# Patient Record
Sex: FEMALE
[De-identification: names, ages, dates, MRNs, and addresses within clinical notes are randomized per-mention and may not be internally consistent; named-entity substitution may affect disease eponyms.]

---

## 2020-03-25 ENCOUNTER — APPOINTMENT (OUTPATIENT)
Dept: GYNECOLOGIC ONCOLOGY | Facility: CLINIC | Age: 35
End: 2020-03-25

## 2020-04-10 ENCOUNTER — OUTPATIENT (OUTPATIENT)
Dept: OUTPATIENT SERVICES | Facility: HOSPITAL | Age: 35
LOS: 1 days | End: 2020-04-10
Payer: COMMERCIAL

## 2020-04-10 ENCOUNTER — APPOINTMENT (OUTPATIENT)
Dept: GYNECOLOGIC ONCOLOGY | Facility: CLINIC | Age: 35
End: 2020-04-10

## 2020-04-10 DIAGNOSIS — R87.612 LOW GRADE SQUAMOUS INTRAEPITHELIAL LESION ON CYTOLOGIC SMEAR OF CERVIX (LGSIL): ICD-10-CM

## 2020-04-10 LAB — SURGICAL PATHOLOGY STUDY: SIGNIFICANT CHANGE UP

## 2020-04-10 PROCEDURE — 88321 CONSLTJ&REPRT SLD PREP ELSWR: CPT

## 2020-05-14 ENCOUNTER — APPOINTMENT (OUTPATIENT)
Dept: GYNECOLOGIC ONCOLOGY | Facility: CLINIC | Age: 35
End: 2020-05-14

## 2020-06-04 ENCOUNTER — APPOINTMENT (OUTPATIENT)
Dept: GYNECOLOGIC ONCOLOGY | Facility: CLINIC | Age: 35
End: 2020-06-04

## 2020-06-10 ENCOUNTER — TRANSCRIPTION ENCOUNTER (OUTPATIENT)
Age: 35
End: 2020-06-10

## 2020-06-10 ENCOUNTER — APPOINTMENT (OUTPATIENT)
Dept: GYNECOLOGIC ONCOLOGY | Facility: CLINIC | Age: 35
End: 2020-06-10
Payer: COMMERCIAL

## 2020-06-10 VITALS
WEIGHT: 147 LBS | DIASTOLIC BLOOD PRESSURE: 80 MMHG | HEIGHT: 64 IN | BODY MASS INDEX: 25.1 KG/M2 | SYSTOLIC BLOOD PRESSURE: 122 MMHG

## 2020-06-10 DIAGNOSIS — Z86.11 PERSONAL HISTORY OF TUBERCULOSIS: ICD-10-CM

## 2020-06-10 DIAGNOSIS — Z87.09 PERSONAL HISTORY OF OTHER DISEASES OF THE RESPIRATORY SYSTEM: ICD-10-CM

## 2020-06-10 PROCEDURE — 99203 OFFICE O/P NEW LOW 30 MIN: CPT | Mod: 25

## 2020-06-10 PROCEDURE — 57454 BX/CURETT OF CERVIX W/SCOPE: CPT

## 2020-06-10 RX ORDER — FLUTICASONE PROPION/SALMETEROL 100-50 MCG
100-50 BLISTER, WITH INHALATION DEVICE INHALATION
Refills: 0 | Status: ACTIVE | COMMUNITY

## 2020-06-10 RX ORDER — MONTELUKAST SODIUM 10 MG/1
TABLET, FILM COATED ORAL
Refills: 0 | Status: ACTIVE | COMMUNITY

## 2020-06-10 NOTE — PROCEDURE
[Colposcopy] : colposcopy [Cervical Dysplasia] : cervical dysplasia [Patient] : the patient [LGSIL] : low grade squamous intraepithelial lesion [Verbal Consent] : verbal consent was obtained prior to the procedure and is detailed in the patient's record [Biopsies Taken: # ___] : [unfilled] biopsies taken of the cervix [Acetowhite ___ o'clock] : ascetowhite changes at the [unfilled] ~Uo'clock position [Biopsy Locations ___ o'clock] : the biopsies were taken at the [unfilled] o'clock position [ECC Done] : an Endocervical curettage was performed.

## 2020-06-10 NOTE — PHYSICAL EXAM
[Normal] : Anus and perineum: Normal sphincter tone, no masses, no prolapse. [Fully active, able to carry on all pre-disease performance without restriction] : Status 0 - Fully active, able to carry on all pre-disease performance without restriction [Abnormal] : Examination of breasts: Abnormal [de-identified] : 1cm smooth, mobile cyst noted near nipple of L breast

## 2020-06-10 NOTE — HISTORY OF PRESENT ILLNESS
[FreeTextEntry1] : Problem\par 1) Abnormal Pap\par 2) Atypia on ECC\par \par Previous Therapy\par 1) Pap 9/2018 NILM, HPV neg\par 2) Pap 12/2019 LSIL HPV+ non 16/18\par 3) Colposcopy Cervical bx 7 wnl\par     a) ECC HGCIN\par    b) * slide review @ [facility] - Detached unoriented strips of squamous epithelium with atypia, suggestive of high grade dysplasia (but [facility] unalbe to do Ki67 and p16)\par \par 34 yo

## 2020-06-10 NOTE — PAST MEDICAL HISTORY
[Definite ___ (Date)] : the last menstrual period was [unfilled] [Menstruating] : The patient is menstruating [Total Preg ___] : G[unfilled] [Live Births ___] : P[unfilled]  [FreeTextEntry5] : D&C x 1 VTOP [Abortions ___] : Abortions:[unfilled]

## 2020-06-10 NOTE — ASSESSMENT
[FreeTextEntry1] : With the aid of diagrams we reviewed the findings in detail.  We reviewed HPV its pathogenesis and the implications of an abnormal cervical cytology and the pathogenesis of dysplasia in detail.ASCUS with high-risk HPV is associated with 4% of Pap smears.\par \par It has been reported that 40 to 58 percent of NICOLASA 2 lesions will regress if left untreated, while 22 percent progress to NICOLASA 3, and 5 percent progress to invasive cancer. ASCCP guidelines were reviewed with the patient. Excision procedure is recommended for both NICOLASA 2 and CIN3. \par \par The risks and benefits of LEEP vs. CKC were discussed which include, but are not limited to: bleeding, infection, cervical stenosis, cervical insufficiency. Possibility of needing a repeat procedure or hysterectomy was also discussed. I also discussed the possibility that no abnormality will be seen on the LEEP specimen.\par \par []Colposcopy today, Cervical Bx & ECC repeated\par []If repeat path confirms HGCIN - Office LEEP procedure is recommended in this case.\par []breast exam with small but palpable cyst, referral for L breast sonogram

## 2020-06-17 ENCOUNTER — APPOINTMENT (OUTPATIENT)
Dept: MAMMOGRAPHY | Facility: CLINIC | Age: 35
End: 2020-06-17
Payer: COMMERCIAL

## 2020-06-17 ENCOUNTER — RESULT REVIEW (OUTPATIENT)
Age: 35
End: 2020-06-17

## 2020-06-17 ENCOUNTER — APPOINTMENT (OUTPATIENT)
Dept: ULTRASOUND IMAGING | Facility: CLINIC | Age: 35
End: 2020-06-17
Payer: COMMERCIAL

## 2020-06-17 ENCOUNTER — OUTPATIENT (OUTPATIENT)
Dept: OUTPATIENT SERVICES | Facility: HOSPITAL | Age: 35
LOS: 1 days | End: 2020-06-17

## 2020-06-17 PROCEDURE — 77062 BREAST TOMOSYNTHESIS BI: CPT | Mod: 26

## 2020-06-17 PROCEDURE — 76642 ULTRASOUND BREAST LIMITED: CPT | Mod: 26,LT

## 2020-06-17 PROCEDURE — 77066 DX MAMMO INCL CAD BI: CPT | Mod: 26

## 2020-06-29 ENCOUNTER — TRANSCRIPTION ENCOUNTER (OUTPATIENT)
Age: 35
End: 2020-06-29

## 2020-08-03 ENCOUNTER — APPOINTMENT (OUTPATIENT)
Dept: GYNECOLOGIC ONCOLOGY | Facility: CLINIC | Age: 35
End: 2020-08-03
Payer: COMMERCIAL

## 2020-08-03 VITALS
DIASTOLIC BLOOD PRESSURE: 78 MMHG | BODY MASS INDEX: 20.32 KG/M2 | HEIGHT: 64 IN | SYSTOLIC BLOOD PRESSURE: 116 MMHG | WEIGHT: 119 LBS

## 2020-08-03 PROCEDURE — 57460 BX OF CERVIX W/SCOPE LEEP: CPT

## 2020-08-03 NOTE — PAST MEDICAL HISTORY
[Menstruating] : The patient is menstruating [Definite ___ (Date)] : the last menstrual period was [unfilled] [Total Preg ___] : G[unfilled] [Live Births ___] : P[unfilled]  [Abortions ___] : Abortions:[unfilled] [FreeTextEntry5] : D&C x 1 VTOP

## 2020-08-03 NOTE — HISTORY OF PRESENT ILLNESS
[FreeTextEntry1] : Problem\par 1) Abnormal Pap\par 2) Atypia on ECC\par \par Previous Therapy\par 1) Pap 9/2018 NILM, HPV neg\par 2) Pap 12/2019 LSIL HPV+ non 16/18\par 3) Colposcopy Cervical bx 7 wnl\par     a) ECC HGCIN\par    b) * slide review @ [facility] - Detached unoriented strips of squamous epithelium with atypia, suggestive of high grade dysplasia (but [facility] unalbe to do Ki67 and p16)\par 4) Colposcopy 6/2020\par    a) cervix 7 - HGCIN ECC benign\par \par Here today for LEEP.

## 2020-08-03 NOTE — ASSESSMENT
[FreeTextEntry1] : Uncomplicated LEEP procedure\par WIll call with pathology results\par Post procedure instructions given\par Follow up in 1 month\par *Patient reports new urinary frequency with no dysuria - order TVUS at next visit

## 2020-08-03 NOTE — PROCEDURE
[LEEP] : LEEBELLA [FreeTextEntry1] : The LEEP procedure was discussed with the patient in detail. The risks, benefits, alternatives and indications for the procedure were reviewed in detail. The patient understands the implications of a LEEP procedure on future fertility and pregnancy. She understands that she could have some cervical stenosis making obtaining pregnancy difficulty in the future. She also understands the risk of  delivery after a LEEP procedure with the decreased cervical stroma remaining. The patient understands the risk of infection, the risk of bleeding and the risk of injury to other organs during this procedure. All the patient's questions were answered regarding the procedure prior to proceeding. \par \par Verbal consent was obtained. \par \par The patient was placed in dorsal lithotomy. Her external female genitalia appeared within normal limits. A speculum was inserted. The vaginal epithelium was visualized and appeared to be within normal limits. Her cervix is easily visualized. Lugol solution was placed on her cervix, and the entire extent of the lesion was identified. The patient's cervical stroma was injected with 1% lidocaine superficially circumferentially around her cervix. A LEEP was then performed removing an approximate 3 mm depth of tissue, excising the entire transformation zone. This was done in an uncomplicated fashion. The loop was removed and ball cautery was used to cauterize the base of the LEEP specimen and create hemostasis. ECC was performed and patency of the cervical os was confirmed. Excellent hemostasis was noted. Monsel solution was placed on the patient's cervix. \par \par The patient tolerated the procedure well. There were no complications with the procedure. The patient was doing well post procedure. She was not lightheaded or dizzy and was asymptomatic. Her cervix was hemostatic.\par

## 2020-08-03 NOTE — PHYSICAL EXAM
[Abnormal] : Examination of breasts: Abnormal [Normal] : Recto-Vaginal Exam: Normal [Fully active, able to carry on all pre-disease performance without restriction] : Status 0 - Fully active, able to carry on all pre-disease performance without restriction [de-identified] : 1cm smooth, mobile cyst noted near nipple of L breast

## 2020-09-04 ENCOUNTER — APPOINTMENT (OUTPATIENT)
Dept: GYNECOLOGIC ONCOLOGY | Facility: CLINIC | Age: 35
End: 2020-09-04
Payer: COMMERCIAL

## 2020-09-04 VITALS
SYSTOLIC BLOOD PRESSURE: 122 MMHG | BODY MASS INDEX: 22.53 KG/M2 | WEIGHT: 132 LBS | DIASTOLIC BLOOD PRESSURE: 82 MMHG | HEIGHT: 64 IN

## 2020-09-04 DIAGNOSIS — R35.0 FREQUENCY OF MICTURITION: ICD-10-CM

## 2020-09-04 DIAGNOSIS — A60.00 HERPESVIRAL INFECTION OF UROGENITAL SYSTEM, UNSPECIFIED: ICD-10-CM

## 2020-09-04 PROCEDURE — 99214 OFFICE O/P EST MOD 30 MIN: CPT

## 2020-09-04 RX ORDER — VALACYCLOVIR 1 G/1
1 TABLET, FILM COATED ORAL
Qty: 60 | Refills: 3 | Status: ACTIVE | COMMUNITY
Start: 2020-09-04 | End: 1900-01-01

## 2020-09-04 NOTE — ASSESSMENT
[FreeTextEntry1] : Reviewed pathology results, + endocervical margine but negative post LEEP ECC could repeat excision or observe\par Patient prefers observation\par Repeat pap/hpv/ecc at next visit in 6 months\par new urinary frequency with no dysuria - TVUS ordered today\par Genital herpes outbreak, rx sent to pharmacy

## 2020-09-04 NOTE — HISTORY OF PRESENT ILLNESS
[FreeTextEntry1] : Problem\par 1) Abnormal Pap\par 2) Atypia on ECC\par \par Previous Therapy\par 1) Pap 9/2018 NILM, HPV neg\par 2) Pap 12/2019 LSIL HPV+ non 16/18\par 3) Colposcopy Cervical bx 7 wnl\par     a) ECC HGCIN\par    b) * slide review @ [facility] - Detached unoriented strips of squamous epithelium with atypia, suggestive of high grade dysplasia (but [facility] unalbe to do Ki67 and p16)\par 4) Colposcopy 6/2020\par    a) cervix 7 - HGCIN ECC benign\par \par Here today for LEEP.

## 2021-03-03 ENCOUNTER — NON-APPOINTMENT (OUTPATIENT)
Age: 36
End: 2021-03-03

## 2021-03-05 ENCOUNTER — APPOINTMENT (OUTPATIENT)
Dept: GYNECOLOGIC ONCOLOGY | Facility: CLINIC | Age: 36
End: 2021-03-05
Payer: COMMERCIAL

## 2021-03-05 ENCOUNTER — LABORATORY RESULT (OUTPATIENT)
Age: 36
End: 2021-03-05

## 2021-03-05 VITALS
HEART RATE: 70 BPM | BODY MASS INDEX: 23.22 KG/M2 | HEIGHT: 64 IN | RESPIRATION RATE: 16 BRPM | DIASTOLIC BLOOD PRESSURE: 83 MMHG | OXYGEN SATURATION: 99 % | WEIGHT: 136 LBS | SYSTOLIC BLOOD PRESSURE: 130 MMHG | TEMPERATURE: 97.7 F

## 2021-03-05 PROCEDURE — 99072 ADDL SUPL MATRL&STAF TM PHE: CPT

## 2021-03-05 PROCEDURE — 99395 PREV VISIT EST AGE 18-39: CPT

## 2021-03-05 NOTE — PROCEDURE
[Cervical Pap] : cervical pap smear  [Other ___] : [unfilled] [Abnormal Pap Smear] : an abnormal pap smear [Patient] : the patient [Allergies Reviewed] : allergies were reviewed [None] : none [Yes] : the specimen was sent to pathology [No Complications] : none [Tolerated Well] : the patient tolerated the procedure well [FreeTextEntry1] : neg HCG

## 2021-03-05 NOTE — DISCUSSION/SUMMARY
[FreeTextEntry1] : If neg results from PAP/ECC today, pt will return annually\par urinary frequency with no dysuria - TVUS ordered today\par discussed breast tenderness with patient and educated on hormonal changes, pt was educated on self breast exams- frequency, at same time each month. Pt was educated on what signs to look for on exam, and if tenderness continues ultrasound will be ordered upon f/u

## 2021-03-05 NOTE — HISTORY OF PRESENT ILLNESS
[FreeTextEntry1] : Problem\par 1) Abnormal Pap\par 2) Atypia on ECC\par \par Previous Therapy\par 1) Pap 9/2018 NILM, HPV neg\par 2) Pap 12/2019 LSIL HPV+ non 16/18\par 3) Colposcopy Cervical bx 7 wnl\par     a) ECC HGCIN\par    b) * slide review @ [facility] - Detached unoriented strips of squamous epithelium with atypia, suggestive of high grade dysplasia (but [facility] unalbe to do Ki67 and p16)\par 4) Colposcopy 6/2020\par    a) cervix 7 - HGCIN ECC benign\par 5) LEEP 8/7/2020\par   a) CIN2 + endocervical margin, negative post LEEP ECC\par \par Pt here for 6 mon f/u of LEEP procedure. LMP: 2/3, pt is sexually active and should get period this week, unsure of pregnancy status but not actively trying, no OCP. Pt states that she currently has breast tenderness and noticed L breast cyst on self breast exam. Pt still has complain of urinary frequency from last visit and did not get US, pt requests new script for US. Pt has no other complaints and feeling good since last visit. Denies abd pain, bleeding, abnormal discharge, fever, chills, abnormal bowel movements.

## 2021-03-08 LAB — HPV HIGH+LOW RISK DNA PNL CVX: DETECTED

## 2021-03-16 ENCOUNTER — TRANSCRIPTION ENCOUNTER (OUTPATIENT)
Age: 36
End: 2021-03-16

## 2021-04-16 ENCOUNTER — LABORATORY RESULT (OUTPATIENT)
Age: 36
End: 2021-04-16

## 2021-04-16 ENCOUNTER — APPOINTMENT (OUTPATIENT)
Dept: GYNECOLOGIC ONCOLOGY | Facility: CLINIC | Age: 36
End: 2021-04-16
Payer: MEDICAID

## 2021-04-16 VITALS
BODY MASS INDEX: 22.2 KG/M2 | WEIGHT: 130 LBS | TEMPERATURE: 207.68 F | HEART RATE: 78 BPM | HEIGHT: 64 IN | DIASTOLIC BLOOD PRESSURE: 83 MMHG | SYSTOLIC BLOOD PRESSURE: 125 MMHG | OXYGEN SATURATION: 97 %

## 2021-04-16 PROCEDURE — 57455 BIOPSY OF CERVIX W/SCOPE: CPT

## 2021-04-16 PROCEDURE — 99072 ADDL SUPL MATRL&STAF TM PHE: CPT

## 2021-04-16 NOTE — HISTORY OF PRESENT ILLNESS
[FreeTextEntry1] : Problem\par 1) Abnormal Pap\par 2) Atypia on ECC\par \par Previous Therapy\par 1) Pap 9/2018 NILM, HPV neg\par 2) Pap 12/2019 LSIL HPV+ non 16/18\par 3) Colposcopy Cervical bx 7 wnl\par     a) ECC HGCIN\par    b) * slide review @ [facility] - Detached unoriented strips of squamous epithelium with atypia, suggestive of high grade dysplasia (but [facility] unalbe to do Ki67 and p16)\par 4) Colposcopy 6/2020\par    a) cervix 7 - HGCIN ECC benign\par 5) LEEP 8/7/2020\par   a) CIN2 + endocervical margin, negative post LEEP ECC\par 6) pap 3/2021 LSIL. HPV+ (non 16/18) ECC wnl\par \par Here for colpoospcy after LSIL pap.

## 2021-04-16 NOTE — PROCEDURE
[Yes] : the specimen was sent to pathology [No Complications] : none [Tolerated Well] : the patient tolerated the procedure well [FreeTextEntry1] : neg HCG  [Colposcopy] : colposcopy [LGSIL] : low grade squamous intraepithelial lesion [Patient] : the patient [Verbal Consent] : verbal consent was obtained prior to the procedure and is detailed in the patient's record [SCJ Fully Visualized] : the squamocolumnar junction was fully visualized [No Abnormalities] : no abnormalities [Acetowhite ___ o'clock] : ascetowhite changes at the [unfilled] ~Uo'clock position [Normal Staining] : normal staining [Biopsies Taken: # ___] : [unfilled] biopsies taken of the cervix [ECC Done] : an endocervical curettage was not performed [Biopsy Locations ___ o'clock] : the biopsies were taken at the [unfilled] o'clock position

## 2021-04-16 NOTE — REVIEW OF SYSTEMS
[Negative] : Musculoskeletal [Hematuria] : no hematuria [Dysuria] : no dysuria [Vaginal Discharge] : no vaginal discharge [Abn Vag Bleeding] : no abnormal vaginal bleeding [Dyspareunia] : no dyspareunia [Incontinence] : incontinence [Normal Sexual Function] : normal sexual function [FreeTextEntry4] : L breast pain x 1 month

## 2021-04-16 NOTE — DISCUSSION/SUMMARY
[FreeTextEntry1] : Colposcopy today c/w CIN1\par Will contact with biopsy results to determine plan of care\par Uneventful procedure, patient tolerated well

## 2021-12-14 ENCOUNTER — TRANSCRIPTION ENCOUNTER (OUTPATIENT)
Age: 36
End: 2021-12-14

## 2021-12-14 ENCOUNTER — NON-APPOINTMENT (OUTPATIENT)
Age: 36
End: 2021-12-14

## 2022-06-21 ENCOUNTER — LABORATORY RESULT (OUTPATIENT)
Age: 37
End: 2022-06-21

## 2022-06-21 ENCOUNTER — NON-APPOINTMENT (OUTPATIENT)
Age: 37
End: 2022-06-21

## 2022-06-21 ENCOUNTER — APPOINTMENT (OUTPATIENT)
Dept: GYNECOLOGIC ONCOLOGY | Facility: CLINIC | Age: 37
End: 2022-06-21

## 2022-06-21 VITALS
BODY MASS INDEX: 21.94 KG/M2 | WEIGHT: 128.5 LBS | HEIGHT: 64 IN | TEMPERATURE: 206.06 F | OXYGEN SATURATION: 99 % | HEART RATE: 60 BPM | DIASTOLIC BLOOD PRESSURE: 66 MMHG | SYSTOLIC BLOOD PRESSURE: 131 MMHG

## 2022-06-21 DIAGNOSIS — Z00.00 ENCOUNTER FOR GENERAL ADULT MEDICAL EXAMINATION W/OUT ABNORMAL FINDINGS: ICD-10-CM

## 2022-06-21 PROCEDURE — 99395 PREV VISIT EST AGE 18-39: CPT

## 2022-06-22 LAB
T PALLIDUM AB SER QL IA: NEGATIVE
T PALLIDUM AB SER QL IA: NEGATIVE

## 2022-06-23 LAB
CYTOLOGY CVX/VAG DOC THIN PREP: NORMAL
HCV AB SER QL: NONREACTIVE
HCV S/CO RATIO: 0.22 S/CO
HIV1+2 AB SPEC QL IA.RAPID: NONREACTIVE

## 2022-06-29 ENCOUNTER — TRANSCRIPTION ENCOUNTER (OUTPATIENT)
Age: 37
End: 2022-06-29

## 2022-06-29 LAB
C TRACH RRNA SPEC QL NAA+PROBE: NOT DETECTED
CYTOLOGY CVX/VAG DOC THIN PREP: ABNORMAL
HPV HIGH+LOW RISK DNA PNL CVX: DETECTED
N GONORRHOEA RRNA SPEC QL NAA+PROBE: NOT DETECTED
SOURCE TP AMPLIFICATION: NORMAL

## 2022-07-12 ENCOUNTER — APPOINTMENT (OUTPATIENT)
Dept: GYNECOLOGIC ONCOLOGY | Facility: CLINIC | Age: 37
End: 2022-07-12

## 2022-07-12 VITALS
WEIGHT: 124 LBS | HEART RATE: 66 BPM | BODY MASS INDEX: 21.17 KG/M2 | SYSTOLIC BLOOD PRESSURE: 131 MMHG | OXYGEN SATURATION: 97 % | TEMPERATURE: 98.1 F | DIASTOLIC BLOOD PRESSURE: 76 MMHG | HEIGHT: 64 IN

## 2022-07-12 DIAGNOSIS — N87.9 DYSPLASIA OF CERVIX UTERI, UNSPECIFIED: ICD-10-CM

## 2022-07-12 PROCEDURE — 57460 BX OF CERVIX W/SCOPE LEEP: CPT

## 2022-07-12 NOTE — HISTORY OF PRESENT ILLNESS
[FreeTextEntry1] : Problem\par 1) Abnormal Pap\par 2) Atypia on ECC\par \par Previous Therapy\par 1) Pap 9/2018 NILM, HPV neg\par 2) Pap 12/2019 LSIL HPV+ non 16/18\par 3) Colposcopy Cervical bx 7 wnl\par     a) ECC HGCIN\par    b) * slide review @ [facility] - Detached unoriented strips of squamous epithelium with atypia, suggestive of high grade dysplasia (but [facility] unalbe to do Ki67 and p16)\par 4) Colposcopy 6/2020\par    a) cervix 7 - HGCIN ECC benign\par 5) LEEP 8/7/2020\par   a) CIN2 + endocervical margin, negative post LEEP ECC\par 6) pap 3/2021 LSIL. HPV+ (non 16/18) ECC wnl\par 7) Colposcopy 7/2021 Cervix 12 o clock wnl\par \par Here for annual exam. Feeling well. \par L breast cyst feels smaller but she notices occasional twinges of pain/pinching in the L breast.\par Has noticed a vaginal odor after working out, no change in discharge or vaginal irritation/itching. \par Periods - regular once a month, had one episode of early menstruation in December but has been regular and predictable since then. \par

## 2022-07-12 NOTE — DISCUSSION/SUMMARY
[FreeTextEntry1] : Uncomplicated LEEP procedure today\par Herpes testing at patients request\par repeat mammogram/sonogram\par post procedure instructions reviewed\par will call with finalized pathology\par Follow up in 1 month

## 2022-07-12 NOTE — PROCEDURE
[Yes] : the specimen was sent to pathology [No Complications] : none [Tolerated Well] : the patient tolerated the procedure well [FreeTextEntry1] : neg HCG  [ECC Done] : an endocervical curettage was not performed

## 2022-07-12 NOTE — HISTORY OF PRESENT ILLNESS
[FreeTextEntry1] : Problem\par 1) Abnormal Pap\par 2) Atypia on ECC\par \par Previous Therapy\par 1) Pap 9/2018 NILM, HPV neg\par 2) Pap 12/2019 LSIL HPV+ non 16/18\par 3) Colposcopy Cervical bx 7 wnl\par     a) ECC HGCIN\par    b) * slide review @ [facility] - Detached unoriented strips of squamous epithelium with atypia, suggestive of high grade dysplasia (but [facility] unalbe to do Ki67 and p16)\par 4) Colposcopy 6/2020\par    a) cervix 7 - HGCIN ECC benign\par 5) LEEP 8/7/2020\par   a) CIN2 + endocervical margin, negative post LEEP ECC\par 6) pap 3/2021 LSIL. HPV+ (non 16/18) ECC wnl\par 7) Colposcopy 7/2021 Cervix 12 o clock wnl\par 8) Pap HSIL ,HPV neg, ECC CIN2-3 6/2021\par Here for repeat LEEP procedure after CIN2/3 found on ECC last visit. Feeling well. Upreg negative. \par

## 2022-07-12 NOTE — PROCEDURE
[Yes] : the specimen was sent to pathology [No Complications] : none [Tolerated Well] : the patient tolerated the procedure well [Colposcopy] : colposcopy [HGSIL] : HGSIL [Patient] : the patient [Verbal Consent] : verbal consent was obtained prior to the procedure and is detailed in the patient's record [ECC Done] : an endocervical curettage was not performed [FreeTextEntry1] : The LEEP procedure was discussed with the patient in detail. The risks, benefits, alternatives and indications for the procedure were reviewed in detail. The patient understands the implications of a LEEP procedure on future fertility and pregnancy. She understands that she could have some cervical stenosis making obtaining pregnancy difficulty in the future. She also understands the risk of  delivery after a LEEP procedure with the decreased cervical stroma remaining. The patient understands the risk of infection, the risk of bleeding and the risk of injury to other organs during this procedure. All the patient's questions were answered regarding the procedure prior to proceeding. \par \par Verbal consent was obtained. \par \par The patient was placed in dorsal lithotomy. Her external female genitalia appeared within normal limits. A speculum was inserted. The vaginal epithelium was visualized and appeared to be within normal limits. Her cervix is easily visualized. Lugol solution was placed on her cervix, and the entire extent of the lesion was identified. The patient's cervical stroma was injected with 1% lidocaine superficially circumferentially around her cervix. A LEEP was then performed removing an approximate 3 mm depth of tissue, excising the entire transformation zone. A second pass was done via top hat of the endocervix given + ECC. This was done in an uncomplicated fashion. The loop was removed and ball cautery was used to cauterize the base of the LEEP specimen and create hemostasis. ECC was performed and patency of the cervical os was confirmed. Excellent hemostasis was noted. Monsel solution was placed on the patient's cervix. \par \par The patient tolerated the procedure well. There were no complications with the procedure. The patient was doing well post procedure. She was not lightheaded or dizzy and was asymptomatic. Her cervix was hemostatic.\par

## 2022-07-12 NOTE — REVIEW OF SYSTEMS
[Negative] : Musculoskeletal [Incontinence] : incontinence [Normal Sexual Function] : normal sexual function [Hematuria] : no hematuria [Dysuria] : no dysuria [Vaginal Discharge] : no vaginal discharge [Abn Vag Bleeding] : no abnormal vaginal bleeding [Dyspareunia] : no dyspareunia [FreeTextEntry4] : L breast pain x 1 month

## 2022-07-12 NOTE — DISCUSSION/SUMMARY
[FreeTextEntry1] : Annual exam\par Pap/HPV/ECC repeated given last years abnormal pap\par STD testing done today\par Mammogram/breast US given family history\par

## 2022-07-14 LAB
HSV 1+2 IGG SER IA-IMP: POSITIVE
HSV 1+2 IGG SER IA-IMP: POSITIVE
HSV1 IGG SER QL: 46.3 INDEX
HSV2 IGG SER QL: 10.8 INDEX

## 2022-07-20 ENCOUNTER — TRANSCRIPTION ENCOUNTER (OUTPATIENT)
Age: 37
End: 2022-07-20

## 2022-07-20 LAB
CORE LAB BIOPSY: NORMAL
HSV1 IGM SER QL: NEGATIVE
HSV2 AB FLD-ACNC: NEGATIVE

## 2022-07-21 ENCOUNTER — TRANSCRIPTION ENCOUNTER (OUTPATIENT)
Age: 37
End: 2022-07-21

## 2022-08-10 ENCOUNTER — RESULT REVIEW (OUTPATIENT)
Age: 37
End: 2022-08-10

## 2022-08-10 ENCOUNTER — APPOINTMENT (OUTPATIENT)
Dept: ULTRASOUND IMAGING | Facility: CLINIC | Age: 37
End: 2022-08-10

## 2022-08-10 ENCOUNTER — APPOINTMENT (OUTPATIENT)
Dept: MAMMOGRAPHY | Facility: CLINIC | Age: 37
End: 2022-08-10

## 2022-08-10 ENCOUNTER — OUTPATIENT (OUTPATIENT)
Dept: OUTPATIENT SERVICES | Facility: HOSPITAL | Age: 37
LOS: 1 days | End: 2022-08-10

## 2022-08-10 PROCEDURE — 76641 ULTRASOUND BREAST COMPLETE: CPT | Mod: 26,50

## 2022-08-10 PROCEDURE — 77063 BREAST TOMOSYNTHESIS BI: CPT | Mod: 26

## 2022-08-10 PROCEDURE — 77067 SCR MAMMO BI INCL CAD: CPT | Mod: 26

## 2022-08-11 ENCOUNTER — APPOINTMENT (OUTPATIENT)
Dept: GYNECOLOGIC ONCOLOGY | Facility: CLINIC | Age: 37
End: 2022-08-11

## 2022-08-11 VITALS
OXYGEN SATURATION: 97 % | BODY MASS INDEX: 21.17 KG/M2 | HEIGHT: 64 IN | HEART RATE: 71 BPM | TEMPERATURE: 97.1 F | DIASTOLIC BLOOD PRESSURE: 79 MMHG | SYSTOLIC BLOOD PRESSURE: 125 MMHG | WEIGHT: 124 LBS

## 2022-08-11 PROCEDURE — 99213 OFFICE O/P EST LOW 20 MIN: CPT

## 2022-08-11 NOTE — HISTORY OF PRESENT ILLNESS
[FreeTextEntry1] : Problem\par 1)  High Grade cervical dysplasia \par \par Previous Therapy\par 1) Pap 9/2018 NILM, HPV neg\par 2) Pap 12/2019 LSIL HPV+ non 16/18\par 3) Colposcopy Cervical bx 7 wnl\par     a) ECC HGCIN\par    b) * slide review @ [facility] - Detached unoriented strips of squamous epithelium with atypia, suggestive of high grade dysplasia (but [facility] unalbe to do Ki67 and p16)\par 4) Colposcopy 6/2020\par    a) cervix 7 - HGCIN ECC benign\par 5) LEEP 8/7/2020\par   a) CIN2 + endocervical margin, negative post LEEP ECC\par 6) pap 3/2021 LSIL. HPV+ (non 16/18) ECC wnl\par 7) Colposcopy 7/2021 Cervix 12 o clock wnl\par 8) Pap HSIL ,HPV neg, ECC CIN2-3 6/2021\par 9) LEEP 7/12/22\par    a) HGCIN on top hat, negative margins (but close per path 3mm) negative post leep ECC\par \par HEre for post leep visit. Feeling well.

## 2022-08-11 NOTE — DISCUSSION/SUMMARY
[FreeTextEntry1] : Repeat LEEp with top hat with HGCIN with negative margin\par \par Repeat cotesting in 1 year\par

## 2022-11-04 ENCOUNTER — TRANSCRIPTION ENCOUNTER (OUTPATIENT)
Age: 37
End: 2022-11-04

## 2022-11-04 RX ORDER — TRIAMCINOLONE ACETONIDE 1 MG/G
0.1 OINTMENT TOPICAL TWICE DAILY
Qty: 1 | Refills: 0 | Status: ACTIVE | COMMUNITY
Start: 2022-11-04 | End: 1900-01-01

## 2022-11-07 ENCOUNTER — TRANSCRIPTION ENCOUNTER (OUTPATIENT)
Age: 37
End: 2022-11-07

## 2022-11-08 ENCOUNTER — NON-APPOINTMENT (OUTPATIENT)
Age: 37
End: 2022-11-08

## 2022-11-08 ENCOUNTER — APPOINTMENT (OUTPATIENT)
Dept: GYNECOLOGIC ONCOLOGY | Facility: CLINIC | Age: 37
End: 2022-11-08

## 2022-11-08 VITALS
TEMPERATURE: 98.1 F | DIASTOLIC BLOOD PRESSURE: 71 MMHG | WEIGHT: 120 LBS | OXYGEN SATURATION: 98 % | BODY MASS INDEX: 20.49 KG/M2 | HEIGHT: 64 IN | HEART RATE: 90 BPM | SYSTOLIC BLOOD PRESSURE: 120 MMHG

## 2022-11-08 DIAGNOSIS — B37.31 ACUTE CANDIDIASIS OF VULVA AND VAGINA: ICD-10-CM

## 2022-11-08 PROCEDURE — 99212 OFFICE O/P EST SF 10 MIN: CPT

## 2022-11-08 NOTE — DISCUSSION/SUMMARY
[FreeTextEntry1] : Exam today c/w yeast infection of vulva, healing\par Vaginitis panel sent given abnormal discharge \par erosions c/w scratching, no concern for HSV\par Will call with vaginitis panel, patient to keep me updated on her symptoms. \par Advise patient avoid latex condoms but seems unlikely to be allergy \par Annual exam with cotesting due in August

## 2022-11-08 NOTE — HISTORY OF PRESENT ILLNESS
[FreeTextEntry1] : Problem\par 1)  High Grade cervical dysplasia \par \par Previous Therapy\par 1) Pap 9/2018 NILM, HPV neg\par 2) Pap 12/2019 LSIL HPV+ non 16/18\par 3) Colposcopy Cervical bx 7 wnl\par     a) ECC HGCIN\par    b) * slide review @ [facility] - Detached unoriented strips of squamous epithelium with atypia, suggestive of high grade dysplasia (but [facility] unalbe to do Ki67 and p16)\par 4) Colposcopy 6/2020\par    a) cervix 7 - HGCIN ECC benign\par 5) LEEP 8/7/2020\par   a) CIN2 + endocervical margin, negative post LEEP ECC\par 6) pap 3/2021 LSIL. HPV+ (non 16/18) ECC wnl\par 7) Colposcopy 7/2021 Cervix 12 o clock wnl\par 8) Pap HSIL ,HPV neg, ECC CIN2-3 6/2021\par 9) LEEP 7/12/22\par    a) HGCIN on top hat, negative margins (but close per path 3mm) negative post leep ECC\par \par Here for follow up. Last week 2 days after having sex with a latex condom noted severe itching dryness and swollen feeling of vulva/vaginal introitus. Treated with triamcinolone with improvement in symptoms. No vaginal itching or discharge. Here concerned about two erosions she noted.

## 2022-11-11 LAB
A VAGINAE DNA VAG QL NAA+PROBE: NEGATIVE
BVAB2 DNA VAG QL NAA+PROBE: NEGATIVE
C KRUSEI DNA VAG QL NAA+PROBE: NEGATIVE
C TRACH RRNA SPEC QL NAA+PROBE: NEGATIVE
MEGA1 DNA VAG QL NAA+PROBE: NEGATIVE
N GONORRHOEA RRNA SPEC QL NAA+PROBE: NEGATIVE
T VAGINALIS RRNA SPEC QL NAA+PROBE: NEGATIVE

## 2022-12-08 NOTE — REVIEW OF SYSTEMS
No [Negative] : Musculoskeletal [Incontinence] : incontinence [Normal Sexual Function] : normal sexual function [Hematuria] : no hematuria [Dysuria] : no dysuria [Vaginal Discharge] : no vaginal discharge [Abn Vag Bleeding] : no abnormal vaginal bleeding [Dyspareunia] : no dyspareunia [FreeTextEntry4] : L breast pain x 1 month

## 2023-04-03 ENCOUNTER — EMERGENCY (EMERGENCY)
Facility: HOSPITAL | Age: 38
LOS: 1 days | Discharge: HOME | End: 2023-04-03
Attending: EMERGENCY MEDICINE
Payer: MEDICAID

## 2023-04-03 VITALS
SYSTOLIC BLOOD PRESSURE: 131 MMHG | DIASTOLIC BLOOD PRESSURE: 74 MMHG | OXYGEN SATURATION: 99 % | TEMPERATURE: 99 F | WEIGHT: 125.44 LBS | HEIGHT: 64 IN | RESPIRATION RATE: 18 BRPM | HEART RATE: 78 BPM

## 2023-04-03 PROCEDURE — 99283 EMERGENCY DEPT VISIT LOW MDM: CPT

## 2023-04-03 RX ORDER — CETIRIZINE HYDROCHLORIDE 10 MG/1
1 TABLET ORAL
Qty: 20 | Refills: 0
Start: 2023-04-03 | End: 2023-04-22

## 2023-04-03 RX ORDER — IBUPROFEN 200 MG
1 TABLET ORAL
Qty: 90 | Refills: 0
Start: 2023-04-03 | End: 2023-05-02

## 2023-04-03 RX ORDER — GLYCERIN 1 %
2 DROPS OPHTHALMIC (EYE)
Qty: 30 | Refills: 0
Start: 2023-04-03 | End: 2023-04-07

## 2023-04-03 NOTE — ED ADULT NURSE NOTE - OBJECTIVE STATEMENT
pt  is a 38 y/o  female with  c/o rt .  ear  pain  since  last  night woke up  with  redness  on  the  rt. eye.

## 2023-04-03 NOTE — ED PROVIDER NOTE - OBJECTIVE STATEMENT
37 year old female with significant history of undergoing toxoplasmosis for left eye (received 3 months of surfer antibiotics and has to continue for another 3 months) is presenting for right ear pain since last as well as right eye conjunctivitis. No fevers, chills, nausea, vomiting, photophobia. Pt also complains of nasal congestion.

## 2023-04-03 NOTE — ED PROVIDER NOTE - PHYSICAL EXAMINATION
Some scarring of the right ear drum as well as left ear drum and Whitish plaque.   Throat with on exudate.   Conjunctivitis noted on the right eye.

## 2023-04-03 NOTE — ED PROVIDER NOTE - CARDIAC, MLM
Breath sounds clear and equal bilaterally. Normal rate, regular rhythm.  Heart sounds S1, S2.  No murmurs, rubs or gallops.

## 2023-04-03 NOTE — ED PROVIDER NOTE - PATIENT PORTAL LINK FT
You can access the FollowMyHealth Patient Portal offered by [facility] by registering at the following website: [email or web address]. By joining [employer]’s FollowMyHealth portal, you will also be able to view your health information using other applications (apps) compatible with our system.

## 2023-04-03 NOTE — ED PROVIDER NOTE - NSFOLLOWUPINSTRUCTIONS_ED_ALL_ED_FT
Log Out.  [employer] CareNotes®  :  [facility]        FLUID IN THE EAR (SEROUS OTITIS MEDIA) - AfterCare(R) Instructions(ER/ED)    Fluid In The Ear (Serous Otitis Media)    WHAT YOU NEED TO KNOW:    LEV is fluid trapped in the middle of your ear behind your eardrum. This condition usually develops without signs or symptoms of an ear infection. Serous otitis media may be caused by an upper respiratory infection or allergies. It is most common in the fall and early spring.  Ear Anatomy    DISCHARGE INSTRUCTIONS:    Call your doctor if:    You develop severe ear pain.    The outside of your ear is red or swollen.    You have fluid coming from your ear.    You have questions or concerns about your condition or care.  How to stay healthy:    Wash your hands often throughout the day. Use soap and water. Rub your soapy hands together, lacing your fingers, for at least 20 seconds. Rinse with warm, running water. Dry your hands with a clean towel or paper towel. Use hand  that contains alcohol if soap and water are not available. Teach children how to wash their hands and use hand .  Handwashing      Avoid people who are sick. Some germs are easily and quickly spread through contact.  Follow up with your doctor as directed: Write down your questions so you remember to ask them during your visits.    © [employer] 1973, 2023    	  back to top            © [employer] 1973, 2023

## 2023-04-03 NOTE — ED PROVIDER NOTE - CLINICAL SUMMARY MEDICAL DECISION MAKING FREE TEXT BOX
Impression: Pt presenting with viral symptoms vs allergies. Give zyrtec and Motrin for pain and Alphagan eye drops.

## 2023-04-03 NOTE — ED PROVIDER NOTE - NSFOLLOWUPCLINICS_GEN_ALL_ED_FT
[facility]  ENT  [address], [address]  [place] [address]  Phone: [phone]  Fax:

## 2023-04-03 NOTE — ED ADULT TRIAGE NOTE - CHIEF COMPLAINT QUOTE
PT REPORTS RIGHT EAR PAIN SINCE LAST NIGHT. WOKE UP WITH REDNESS TO RIGHT EYE . REPORTS COLD X 1 WEEK. PT ON

## 2023-08-17 ENCOUNTER — NON-APPOINTMENT (OUTPATIENT)
Age: 38
End: 2023-08-17

## 2023-08-17 ENCOUNTER — APPOINTMENT (OUTPATIENT)
Dept: GYNECOLOGIC ONCOLOGY | Facility: CLINIC | Age: 38
End: 2023-08-17
Payer: MEDICAID

## 2023-08-17 ENCOUNTER — RESULT REVIEW (OUTPATIENT)
Age: 38
End: 2023-08-17

## 2023-08-17 VITALS
WEIGHT: 123 LBS | OXYGEN SATURATION: 98 % | TEMPERATURE: 96.5 F | DIASTOLIC BLOOD PRESSURE: 71 MMHG | HEIGHT: 64 IN | BODY MASS INDEX: 21 KG/M2 | SYSTOLIC BLOOD PRESSURE: 118 MMHG | HEART RATE: 75 BPM

## 2023-08-17 DIAGNOSIS — Z12.39 ENCOUNTER FOR OTHER SCREENING FOR MALIGNANT NEOPLASM OF BREAST: ICD-10-CM

## 2023-08-17 DIAGNOSIS — N60.02 SOLITARY CYST OF LEFT BREAST: ICD-10-CM

## 2023-08-17 DIAGNOSIS — Z11.3 ENCOUNTER FOR SCREENING FOR INFECTIONS WITH A PREDOMINANTLY SEXUAL MODE OF TRANSMISSION: ICD-10-CM

## 2023-08-17 DIAGNOSIS — R87.612 LOW GRADE SQUAMOUS INTRAEPITHELIAL LESION ON CYTOLOGIC SMEAR OF CERVIX (LGSIL): ICD-10-CM

## 2023-08-17 PROCEDURE — 99213 OFFICE O/P EST LOW 20 MIN: CPT

## 2023-08-17 NOTE — DISCUSSION/SUMMARY
[FreeTextEntry1] : Annual exam without abnormal features pap/hpv done today  Breast cancer screening - high risk - Mammo/sono now, breast MRI ordered for March STD screening [facility] to evaluate

## 2023-08-17 NOTE — PHYSICAL EXAM
[Fully active, able to carry on all pre-disease performance without restriction] : Status 0 - Fully active, able to carry on all pre-disease performance without restriction [Chaperone Present] : A chaperone was present in the examining room during all aspects of the physical examination [Normal] : Breasts: Normal

## 2023-08-17 NOTE — HISTORY OF PRESENT ILLNESS
[FreeTextEntry1] : Problem 1)  High Grade cervical dysplasia   Previous Therapy 1) Pap 9/2018 NILM, HPV neg 2) Pap 12/2019 LSIL HPV+ non 16/18 3) Colposcopy Cervical bx 7 wnl     a) ECC HGCIN    b) * slide review @ [facility] - Detached unoriented strips of squamous epithelium with atypia, suggestive of high grade dysplasia (but [facility] unalbe to do Ki67 and p16) 4) Colposcopy 6/2020    a) cervix 7 - HGCIN ECC benign 5) LEEP 8/7/2020   a) CIN2 + endocervical margin, negative post LEEP ECC 6) pap 3/2021 LSIL. HPV+ (non 16/18) ECC wnl 7) Colposcopy 7/2021 Cervix 12 o clock wnl 8) Pap HSIL ,HPV neg, ECC CIN2-3 6/2021 9) LEEP 7/12/22    a) HGCIN on top hat, negative margins (but close per path 3mm) negative post leep ECC  Here for  annual exam and follow up cotesting after LEEP. Feeling well overall. Concerned about fertility - patient has been sexually active for over 1 year without contraception without getting pregnant. Not actively TTC but concerned about what this means for the future.  Complex Repair Preamble Text (Leave Blank If You Do Not Want): Extensive wide undermining was performed.

## 2023-08-18 LAB
ANTI-MUELLERIAN HORMONE: 3.45 NG/ML
HCV AB SER QL: NONREACTIVE
HCV S/CO RATIO: 0.14 S/CO
HIV1+2 AB SPEC QL IA.RAPID: NONREACTIVE
T PALLIDUM AB SER QL IA: NEGATIVE

## 2023-08-24 ENCOUNTER — TRANSCRIPTION ENCOUNTER (OUTPATIENT)
Age: 38
End: 2023-08-24

## 2023-08-24 LAB
C TRACH RRNA SPEC QL NAA+PROBE: NOT DETECTED
HPV HIGH+LOW RISK DNA PNL CVX: NOT DETECTED
N GONORRHOEA RRNA SPEC QL NAA+PROBE: NOT DETECTED
SOURCE TP AMPLIFICATION: NORMAL

## 2023-08-27 ENCOUNTER — TRANSCRIPTION ENCOUNTER (OUTPATIENT)
Age: 38
End: 2023-08-27

## 2023-08-27 LAB — CYTOLOGY CVX/VAG DOC THIN PREP: ABNORMAL

## 2023-09-01 PROBLEM — Z86.19 PERSONAL HISTORY OF OTHER INFECTIOUS AND PARASITIC DISEASES: Chronic | Status: ACTIVE | Noted: 2023-04-03

## 2023-09-20 ENCOUNTER — OUTPATIENT (OUTPATIENT)
Dept: OUTPATIENT SERVICES | Facility: HOSPITAL | Age: 38
LOS: 1 days | End: 2023-09-20

## 2023-09-20 ENCOUNTER — RESULT REVIEW (OUTPATIENT)
Age: 38
End: 2023-09-20

## 2023-09-20 ENCOUNTER — APPOINTMENT (OUTPATIENT)
Dept: MAMMOGRAPHY | Facility: CLINIC | Age: 38
End: 2023-09-20
Payer: MEDICAID

## 2023-09-20 ENCOUNTER — APPOINTMENT (OUTPATIENT)
Dept: ULTRASOUND IMAGING | Facility: CLINIC | Age: 38
End: 2023-09-20
Payer: MEDICAID

## 2023-09-20 PROCEDURE — 77062 BREAST TOMOSYNTHESIS BI: CPT | Mod: 26

## 2023-09-20 PROCEDURE — 77066 DX MAMMO INCL CAD BI: CPT | Mod: 26

## 2023-09-20 PROCEDURE — 76641 ULTRASOUND BREAST COMPLETE: CPT | Mod: 26,50

## 2024-05-10 ENCOUNTER — APPOINTMENT (OUTPATIENT)
Dept: MRI IMAGING | Facility: CLINIC | Age: 39
End: 2024-05-10
Payer: MEDICAID

## 2024-05-10 ENCOUNTER — OUTPATIENT (OUTPATIENT)
Dept: OUTPATIENT SERVICES | Facility: HOSPITAL | Age: 39
LOS: 1 days | End: 2024-05-10

## 2024-05-10 PROCEDURE — 77049 MRI BREAST C-+ W/CAD BI: CPT | Mod: 26

## 2024-05-15 DIAGNOSIS — R92.8 OTHER ABNORMAL AND INCONCLUSIVE FINDINGS ON DIAGNOSTIC IMAGING OF BREAST: ICD-10-CM

## 2024-06-07 ENCOUNTER — APPOINTMENT (OUTPATIENT)
Dept: MRI IMAGING | Facility: CLINIC | Age: 39
End: 2024-06-07
Payer: MEDICAID

## 2024-06-07 ENCOUNTER — OUTPATIENT (OUTPATIENT)
Dept: OUTPATIENT SERVICES | Facility: HOSPITAL | Age: 39
LOS: 1 days | End: 2024-06-07

## 2024-06-07 ENCOUNTER — RESULT REVIEW (OUTPATIENT)
Age: 39
End: 2024-06-07

## 2024-06-07 PROCEDURE — 88305 TISSUE EXAM BY PATHOLOGIST: CPT | Mod: 26

## 2024-06-07 PROCEDURE — 19085 BX BREAST 1ST LESION MR IMAG: CPT | Mod: RT

## 2024-06-07 PROCEDURE — 77065 DX MAMMO INCL CAD UNI: CPT | Mod: 26,RT

## 2024-06-10 LAB — SURGICAL PATHOLOGY STUDY: SIGNIFICANT CHANGE UP

## 2024-09-04 ENCOUNTER — NON-APPOINTMENT (OUTPATIENT)
Age: 39
End: 2024-09-04

## 2024-09-05 ENCOUNTER — APPOINTMENT (OUTPATIENT)
Dept: GYNECOLOGIC ONCOLOGY | Facility: CLINIC | Age: 39
End: 2024-09-05
Payer: MEDICAID

## 2024-09-05 VITALS
SYSTOLIC BLOOD PRESSURE: 145 MMHG | WEIGHT: 130 LBS | HEART RATE: 60 BPM | DIASTOLIC BLOOD PRESSURE: 81 MMHG | HEIGHT: 64 IN | TEMPERATURE: 97.3 F | OXYGEN SATURATION: 99 % | BODY MASS INDEX: 22.2 KG/M2

## 2024-09-05 DIAGNOSIS — N87.9 DYSPLASIA OF CERVIX UTERI, UNSPECIFIED: ICD-10-CM

## 2024-09-05 DIAGNOSIS — N60.02 SOLITARY CYST OF LEFT BREAST: ICD-10-CM

## 2024-09-05 DIAGNOSIS — Z12.39 ENCOUNTER FOR OTHER SCREENING FOR MALIGNANT NEOPLASM OF BREAST: ICD-10-CM

## 2024-09-05 PROCEDURE — 99459 PELVIC EXAMINATION: CPT

## 2024-09-05 PROCEDURE — 99395 PREV VISIT EST AGE 18-39: CPT

## 2024-09-05 NOTE — HISTORY OF PRESENT ILLNESS
[FreeTextEntry1] : Problem 1)  High Grade cervical dysplasia   Previous Therapy 1) Pap 9/2018 NILM, HPV neg 2) Pap 12/2019 LSIL HPV+ non 16/18 3) Colposcopy Cervical bx 7 wnl     a) ECC HGCIN    b) * slide review @ [facility] - Detached unoriented strips of squamous epithelium with atypia, suggestive of high grade dysplasia (but [facility] unalbe to do Ki67 and p16) 4) Colposcopy 6/2020    a) cervix 7 - HGCIN ECC benign 5) LEEP 8/7/2020   a) CIN2 + endocervical margin, negative post LEEP ECC 6) pap 3/2021 LSIL. HPV+ (non 16/18) ECC wnl 7) Colposcopy 7/2021 Cervix 12 o clock wnl 8) Pap HSIL ,HPV neg, ECC CIN2-3 6/2021 9) LEEP 7/12/22    a) HGCIN on top hat, negative margins (but close per path 3mm) negative post leep ECC 10) Pap 8/23 ASCUS HPV neg  Here for annual exam. Feeling well. Regular menses. SA no issues. Undergoing toxoplasmosis medicine for eye, if cleared would like to TTC with partner. no bowel/.bladder issues.  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 patient feels it is getting bigger, more tender and aware of it with movement.

## 2024-09-05 NOTE — DISCUSSION/SUMMARY
[FreeTextEntry1] : Annual exam without abnormal features pap/hpv done today  Discussed PNV at start of TTC HCM - High risk mammogram 9/2024, MR breast due 3/25 Referral to breast surgeon for possible removal of fibroadenoma

## 2024-09-09 LAB — HPV HIGH+LOW RISK DNA PNL CVX: NOT DETECTED

## 2024-09-12 ENCOUNTER — TRANSCRIPTION ENCOUNTER (OUTPATIENT)
Age: 39
End: 2024-09-12

## 2024-09-12 LAB — CYTOLOGY CVX/VAG DOC THIN PREP: ABNORMAL

## 2024-09-14 ENCOUNTER — TRANSCRIPTION ENCOUNTER (OUTPATIENT)
Age: 39
End: 2024-09-14